# Patient Record
Sex: MALE | Race: WHITE | NOT HISPANIC OR LATINO | Employment: FULL TIME | URBAN - METROPOLITAN AREA
[De-identification: names, ages, dates, MRNs, and addresses within clinical notes are randomized per-mention and may not be internally consistent; named-entity substitution may affect disease eponyms.]

---

## 2017-05-15 ENCOUNTER — NON-PROVIDER VISIT (OUTPATIENT)
Dept: OCCUPATIONAL MEDICINE | Facility: CLINIC | Age: 22
End: 2017-05-15

## 2017-05-15 DIAGNOSIS — Z02.1 PRE-EMPLOYMENT DRUG SCREENING: ICD-10-CM

## 2017-05-15 LAB
AMP AMPHETAMINE: NORMAL
COC COCAINE: NORMAL
INT CON NEG: NORMAL
INT CON POS: NORMAL
MET METHAMPHETAMINES: NORMAL
OPI OPIATES: NORMAL
PCP PHENCYCLIDINE: NORMAL
POC DRUG COMMENT 753798-OCCUPATIONAL HEALTH: NEGATIVE
THC: NORMAL

## 2017-05-15 PROCEDURE — 80305 DRUG TEST PRSMV DIR OPT OBS: CPT | Performed by: PREVENTIVE MEDICINE

## 2017-05-15 NOTE — MR AVS SNAPSHOT
Wilner Cortez   5/15/2017 2:50 PM   Non-Provider Visit   MRN: [id number]    Department:  [facility]   Dept Phone:  [phone]    Description:  Male : 1995   Provider:  CHRISTINE SHAY MA           Reason for Visit     Drug / Alcohol Assessment cero's      Allergies as of 5/15/2017     Not on File      You were diagnosed with     Pre-employment drug screening   [029815]         Basic Information     Date Of Birth Sex Race Ethnicity Preferred Language    1995 Male White Non- English      Health Maintenance     Patient has no pending health maintenance at this time      Results     POCT 6 Panel Urine Drug Screen      Component    AMPHETAMINE    POC THC    COCAINE    OPIATES    PHENCYCLIDINE    METHAMPHETAMINES    POC Urine Drug Screen Comment    Negative    Internal Control Positive    Valid    Internal Control Negative    Valid                        Current Immunizations     No immunizations on file.      Below and/or attached are the medications your provider expects you to take. Review all of your home medications and newly ordered medications with your provider and/or pharmacist. Follow medication instructions as directed by your provider and/or pharmacist. Please keep your medication list with you and share with your provider. Update the information when medications are discontinued, doses are changed, or new medications (including over-the-counter products) are added; and carry medication information at all times in the event of emergency situations     Allergies:  (Not on file)          Medications  Valid as of: May 15, 2017 -  2:57 PM    Generic Name Brand Name Tablet Size Instructions for use    .                 Medicines prescribed today were sent to:     None      Medication refill instructions:       If your prescription bottle indicates you have medication refills left, it is not necessary to call your provider’s office. Please contact your pharmacy and they will refill  your medication.    If your prescription bottle indicates you do not have any refills left, you may request refills at any time through one of the following ways: The online StatSheet system (except Urgent Care), by calling your provider’s office, or by asking your pharmacy to contact your provider’s office with a refill request. Medication refills are processed only during regular business hours and may not be available until the next business day. Your provider may request additional information or to have a follow-up visit with you prior to refilling your medication.   *Please Note: Medication refills are assigned a new Rx number when refilled electronically. Your pharmacy may indicate that no refills were authorized even though a new prescription for the same medication is available at the pharmacy. Please request the medicine by name with the pharmacy before contacting your provider for a refill.           StatSheet Access Code: [email or web address]  Expires: 6/14/2017  2:57 PM    Your email address is not on file at [facility].  Email Addresses are required for you to sign up for StatSheet, please contact [phone] to verify your personal information and to provide your email address prior to attempting to register for StatSheet.    Wilner Cortez  [address] [place] [address]    StatSheet  A secure, online tool to manage your health information     [facility]’s StatSheet® is a secure, online tool that connects you to your personalized health information from the privacy of your home -- day or night - making it very easy for you to manage your healthcare. Once the activation process is completed, you can even access your medical information using the StatSheet jones, which is available for free in the Apple Jones store or Google Play store.     To learn more about StatSheet, visit [email or web address]    There are two levels of access available (as shown below):   My Chart Features  [facility] Primary Care Doctor  [facility]  Specialists [facility]  Urgent  Care [facility] Primary Care Doctor   Email your healthcare team securely and privately 24/7 X X X    Manage appointments: schedule your next appointment; view details of past/upcoming appointments X      Request prescription refills. X      View recent personal medical records, including lab and immunizations X X X X   View health record, including health history, allergies, medications X X X X   Read reports about your outpatient visits, procedures, consult and ER notes X X X X   See your discharge summary, which is a recap of your hospital and/or ER visit that includes your diagnosis, lab results, and care plan X X  X     How to register for EndoShape:  Once your e-mail address has been verified, follow the following steps to sign up for EndoShape.     1. Go to  [email or web address]  2. Click on the Sign Up Now box, which takes you to the New Member Sign Up page. You will need to provide the following information:  a. Enter your EndoShape Access Code exactly as it appears at the top of this page. (You will not need to use this code after you’ve completed the sign-up process. If you do not sign up before the expiration date, you must request a new code.)   b. Enter your date of birth.   c. Enter your home email address.   d. Click Submit, and follow the next screen’s instructions.  3. Create a EndoShape ID. This will be your EndoShape login ID and cannot be changed, so think of one that is secure and easy to remember.  4. Create a EndoShape password. You can change your password at any time.  5. Enter your Password Reset Question and Answer. This can be used at a later time if you forget your password.   6. Enter your e-mail address. This allows you to receive e-mail notifications when new information is available in EndoShape.  7. Click Sign Up. You can now view your health information.    For assistance activating your EndoShape account, call [phone]

## 2023-12-05 ENCOUNTER — HOSPITAL ENCOUNTER (EMERGENCY)
Facility: MEDICAL CENTER | Age: 28
End: 2023-12-05
Attending: EMERGENCY MEDICINE
Payer: MEDICAID

## 2023-12-05 VITALS
SYSTOLIC BLOOD PRESSURE: 116 MMHG | OXYGEN SATURATION: 94 % | DIASTOLIC BLOOD PRESSURE: 78 MMHG | HEART RATE: 76 BPM | BODY MASS INDEX: 28.2 KG/M2 | RESPIRATION RATE: 16 BRPM | HEIGHT: 66 IN | TEMPERATURE: 97.2 F | WEIGHT: 175.49 LBS

## 2023-12-05 DIAGNOSIS — R42 VERTIGO: ICD-10-CM

## 2023-12-05 DIAGNOSIS — R42 DIZZINESS: Primary | ICD-10-CM

## 2023-12-05 LAB
ALBUMIN SERPL BCP-MCNC: 4.8 G/DL (ref 3.2–4.9)
ALBUMIN/GLOB SERPL: 1.3 G/DL
ALP SERPL-CCNC: 117 U/L (ref 30–99)
ALT SERPL-CCNC: 19 U/L (ref 2–50)
ANION GAP SERPL CALC-SCNC: 16 MMOL/L (ref 7–16)
AST SERPL-CCNC: 14 U/L (ref 12–45)
BASOPHILS # BLD AUTO: 0.7 % (ref 0–1.8)
BASOPHILS # BLD: 0.06 K/UL (ref 0–0.12)
BILIRUB SERPL-MCNC: 0.6 MG/DL (ref 0.1–1.5)
BUN SERPL-MCNC: 14 MG/DL (ref 8–22)
CALCIUM ALBUM COR SERPL-MCNC: 9.4 MG/DL (ref 8.5–10.5)
CALCIUM SERPL-MCNC: 10 MG/DL (ref 8.5–10.5)
CHLORIDE SERPL-SCNC: 100 MMOL/L (ref 96–112)
CO2 SERPL-SCNC: 23 MMOL/L (ref 20–33)
CREAT SERPL-MCNC: 0.9 MG/DL (ref 0.5–1.4)
EKG IMPRESSION: NORMAL
EOSINOPHIL # BLD AUTO: 0.01 K/UL (ref 0–0.51)
EOSINOPHIL NFR BLD: 0.1 % (ref 0–6.9)
ERYTHROCYTE [DISTWIDTH] IN BLOOD BY AUTOMATED COUNT: 39.5 FL (ref 35.9–50)
GFR SERPLBLD CREATININE-BSD FMLA CKD-EPI: 120 ML/MIN/1.73 M 2
GLOBULIN SER CALC-MCNC: 3.6 G/DL (ref 1.9–3.5)
GLUCOSE BLD STRIP.AUTO-MCNC: 91 MG/DL (ref 65–99)
GLUCOSE SERPL-MCNC: 96 MG/DL (ref 65–99)
HCT VFR BLD AUTO: 47.8 % (ref 42–52)
HGB BLD-MCNC: 16.6 G/DL (ref 14–18)
IMM GRANULOCYTES # BLD AUTO: 0.02 K/UL (ref 0–0.11)
IMM GRANULOCYTES NFR BLD AUTO: 0.2 % (ref 0–0.9)
LYMPHOCYTES # BLD AUTO: 1.92 K/UL (ref 1–4.8)
LYMPHOCYTES NFR BLD: 21.8 % (ref 22–41)
MCH RBC QN AUTO: 28.7 PG (ref 27–33)
MCHC RBC AUTO-ENTMCNC: 34.7 G/DL (ref 32.3–36.5)
MCV RBC AUTO: 82.7 FL (ref 81.4–97.8)
MONOCYTES # BLD AUTO: 0.46 K/UL (ref 0–0.85)
MONOCYTES NFR BLD AUTO: 5.2 % (ref 0–13.4)
NEUTROPHILS # BLD AUTO: 6.33 K/UL (ref 1.82–7.42)
NEUTROPHILS NFR BLD: 72 % (ref 44–72)
NRBC # BLD AUTO: 0 K/UL
NRBC BLD-RTO: 0 /100 WBC (ref 0–0.2)
PLATELET # BLD AUTO: 325 K/UL (ref 164–446)
PMV BLD AUTO: 9.4 FL (ref 9–12.9)
POTASSIUM SERPL-SCNC: 3.5 MMOL/L (ref 3.6–5.5)
PROT SERPL-MCNC: 8.4 G/DL (ref 6–8.2)
RBC # BLD AUTO: 5.78 M/UL (ref 4.7–6.1)
SODIUM SERPL-SCNC: 139 MMOL/L (ref 135–145)
WBC # BLD AUTO: 8.8 K/UL (ref 4.8–10.8)

## 2023-12-05 PROCEDURE — 85025 COMPLETE CBC W/AUTO DIFF WBC: CPT

## 2023-12-05 PROCEDURE — 80053 COMPREHEN METABOLIC PANEL: CPT

## 2023-12-05 PROCEDURE — 99284 EMERGENCY DEPT VISIT MOD MDM: CPT

## 2023-12-05 PROCEDURE — 700102 HCHG RX REV CODE 250 W/ 637 OVERRIDE(OP): Performed by: EMERGENCY MEDICINE

## 2023-12-05 PROCEDURE — 82962 GLUCOSE BLOOD TEST: CPT

## 2023-12-05 PROCEDURE — A9270 NON-COVERED ITEM OR SERVICE: HCPCS | Performed by: EMERGENCY MEDICINE

## 2023-12-05 PROCEDURE — 36415 COLL VENOUS BLD VENIPUNCTURE: CPT

## 2023-12-05 PROCEDURE — 93005 ELECTROCARDIOGRAM TRACING: CPT

## 2023-12-05 PROCEDURE — 93005 ELECTROCARDIOGRAM TRACING: CPT | Performed by: EMERGENCY MEDICINE

## 2023-12-05 RX ORDER — PROMETHAZINE HYDROCHLORIDE 25 MG/1
25 TABLET ORAL ONCE
Status: COMPLETED | OUTPATIENT
Start: 2023-12-05 | End: 2023-12-05

## 2023-12-05 RX ADMIN — PROMETHAZINE HYDROCHLORIDE 25 MG: 25 TABLET ORAL at 13:29

## 2023-12-05 ASSESSMENT — LIFESTYLE VARIABLES
DO YOU DRINK ALCOHOL: NO
DOES PATIENT WANT TO STOP DRINKING: NO

## 2023-12-05 ASSESSMENT — PAIN DESCRIPTION - PAIN TYPE: TYPE: ACUTE PAIN

## 2023-12-05 NOTE — ED TRIAGE NOTES
"Chief Complaint   Patient presents with    Dizziness     Episodes of dizziness that last for 2-3 days. Intermittent episodes since September. +N/V. Denies CP/SOB.     Pt was diagnosed with vertigo at [facility] and prescribed medication with no relief. Pt goes days without eating due to nausea.   FSBS 91. Pt amb to triage with steady gait. Pt educated to inform staff of any changes.     BP (!) 146/99   Pulse (!) 106   Temp 36.2 °C (97.1 °F) (Temporal)   Resp 16   Ht 1.676 m (5' 6\")   Wt 79.6 kg (175 lb 7.8 oz)   SpO2 98%   BMI 28.32 kg/m²     "

## 2023-12-05 NOTE — ED NOTES
Pt ambulated to the room with steady gait and without assistance. Changed into a gown and placed on SpO2 and BP monitors. Call light within reach. No further complaints at this time. Chart up for ERP.

## 2023-12-05 NOTE — ED PROVIDER NOTES
"  ER Provider Note    Scribed for Gunner Patino Ii, M.d. by Corey Echeverria. 12/5/2023  11:43 AM    Primary Care Provider: Pcp Pt States None    CHIEF COMPLAINT  Chief Complaint   Patient presents with    Dizziness     Episodes of dizziness that last for 2-3 days. Intermittent episodes since September. +N/V. Denies CP/SOB.     EXTERNAL RECORDS REVIEWED  None available    HPI/ROS    LIMITATION TO HISTORY   Select: : None  OUTSIDE HISTORIAN(S):  Significant other at bedside    Wilner Cortez is a 28 y.o. male who presents to the ED complaining of intermittent dizziness onset 3 months ago. The first episode was in September, episodes happen every other week. However 3 days ago he began to have constant dizziness. He is unsure as to if there are any triggers, but when he moves his head fast he gets dizzy. He also feels his symptoms when he stands up. He describes his dizziness as the \"room spinning\". He endorses associated ringing in his ears and vomiting, he states that he has been vomiting fairly regularly. He has not been able to eat anything since Sunday due to his vomiting. He has not been sick recently. [facility] diagnosed with BPPV and started on meclizine. He has been taking this medication and similar medications without alleviation. He does occasionally use marijuana and alcohol. He does not have a history of cannabis hyperemesis.     PAST MEDICAL HISTORY  No past medical history on file.    SURGICAL HISTORY  No past surgical history on file.    FAMILY HISTORY  No family history on file.    SOCIAL HISTORY   reports that he has never smoked. He has never used smokeless tobacco. He reports current alcohol use. He reports current drug use. Drug: Inhaled.    CURRENT MEDICATIONS  There are no discharge medications for this patient.      ALLERGIES  Patient has no known allergies.    PHYSICAL EXAM  BP (!) 146/99   Pulse (!) 106   Temp 36.2 °C (97.1 " "°F) (Temporal)   Resp 16   Ht 1.676 m (5' 6\")   Wt 79.6 kg (175 lb 7.8 oz)   SpO2 98%   BMI 28.32 kg/m²   Physical Exam  Vitals and nursing note reviewed.   Constitutional:       Appearance: Normal appearance.   HENT:      Head: Normocephalic.      Right Ear: Tympanic membrane normal.      Left Ear: Tympanic membrane normal.      Nose: Nose normal.   Eyes:      Extraocular Movements: Extraocular movements intact.      Conjunctiva/sclera: Conjunctivae normal.      Pupils: Pupils are equal, round, and reactive to light.      Comments: No nystagmus   Cardiovascular:      Rate and Rhythm: Normal rate and regular rhythm.   Pulmonary:      Effort: Pulmonary effort is normal.      Breath sounds: Normal breath sounds.   Abdominal:      General: There is no distension.      Tenderness: There is no abdominal tenderness.   Musculoskeletal:         General: No swelling or tenderness. Normal range of motion.      Cervical back: Normal range of motion. No rigidity.   Skin:     General: Skin is warm.   Neurological:      General: No focal deficit present.      Mental Status: He is alert and oriented to person, place, and time.      Comments: Alert and oriented to person place time situation.  No signs of cranial nerve deficit.  Clear speech.  No aphasia.  Normal strength in all extremities.  Walks with a steady gait.  No peripheral or truncal ataxia.  No sensory loss.   Psychiatric:         Mood and Affect: Mood normal.          DIAGNOSTIC STUDIES    Labs:   Results for orders placed or performed during the hospital encounter of 12/05/23   CBC WITH DIFFERENTIAL   Result Value Ref Range    WBC 8.8 4.8 - 10.8 K/uL    RBC 5.78 4.70 - 6.10 M/uL    Hemoglobin 16.6 14.0 - 18.0 g/dL    Hematocrit 47.8 42.0 - 52.0 %    MCV 82.7 81.4 - 97.8 fL    MCH 28.7 27.0 - 33.0 pg    MCHC 34.7 32.3 - 36.5 g/dL    RDW 39.5 35.9 - 50.0 fL    Platelet Count 325 164 - 446 K/uL    MPV 9.4 9.0 - 12.9 fL    Neutrophils-Polys 72.00 44.00 - 72.00 %    " Lymphocytes 21.80 (L) 22.00 - 41.00 %    Monocytes 5.20 0.00 - 13.40 %    Eosinophils 0.10 0.00 - 6.90 %    Basophils 0.70 0.00 - 1.80 %    Immature Granulocytes 0.20 0.00 - 0.90 %    Nucleated RBC 0.00 0.00 - 0.20 /100 WBC    Neutrophils (Absolute) 6.33 1.82 - 7.42 K/uL    Lymphs (Absolute) 1.92 1.00 - 4.80 K/uL    Monos (Absolute) 0.46 0.00 - 0.85 K/uL    Eos (Absolute) 0.01 0.00 - 0.51 K/uL    Baso (Absolute) 0.06 0.00 - 0.12 K/uL    Immature Granulocytes (abs) 0.02 0.00 - 0.11 K/uL    NRBC (Absolute) 0.00 K/uL   Comp Metabolic Panel   Result Value Ref Range    Sodium 139 135 - 145 mmol/L    Potassium 3.5 (L) 3.6 - 5.5 mmol/L    Chloride 100 96 - 112 mmol/L    Co2 23 20 - 33 mmol/L    Anion Gap 16.0 7.0 - 16.0    Glucose 96 65 - 99 mg/dL    Bun 14 8 - 22 mg/dL    Creatinine 0.90 0.50 - 1.40 mg/dL    Calcium 10.0 8.5 - 10.5 mg/dL    Correct Calcium 9.4 8.5 - 10.5 mg/dL    AST(SGOT) 14 12 - 45 U/L    ALT(SGPT) 19 2 - 50 U/L    Alkaline Phosphatase 117 (H) 30 - 99 U/L    Total Bilirubin 0.6 0.1 - 1.5 mg/dL    Albumin 4.8 3.2 - 4.9 g/dL    Total Protein 8.4 (H) 6.0 - 8.2 g/dL    Globulin 3.6 (H) 1.9 - 3.5 g/dL    A-G Ratio 1.3 g/dL   ESTIMATED GFR   Result Value Ref Range    GFR (CKD-EPI) 120 >60 mL/min/1.73 m 2   EKG   Result Value Ref Range    Report       [facility] Emergency Dept.    Test Date:  2023  Pt Name:    IAM ARBOLEDA                 Department: ER  MRN:        [id number]                      Room:  Gender:     Male                         Technician: [id number]  :        1995                   Requested By:ER TRIAGE PROTOCOL  Order #:    [id number]                    Reading MD: Gunner Patino II, MD    Measurements  Intervals                                Axis  Rate:       72                           P:          52  GA:         124                          QRS:        66  QRSD:       97                           T:          43  QT:         386  QTc:         423    Interpretive Statements  Sinus rhythm  Rate 72  Normal intervals  No ST elevation or depression.  Impression: Normal sinus rhythm EKG  No previous ECG available for comparison  Electronically Signed On 12- 12:30:27 PST by Gunner Patino II, MD     POCT glucose device results   Result Value Ref Range    POC Glucose, Blood 91 65 - 99 mg/dL      All labs reviewed by me.     EKG:   I have independently interpreted this EKG. see results above.    COURSE & MEDICAL DECISION MAKING     ED Observation Status? No; Patient does not meet criteria for ED Observation.     INITIAL ASSESSMENT, COURSE AND PLAN  Care Narrative:     12:33 PM - Patient seen and examined at bedside. He presents for dizziness and vomiting which began 3 months ago but has been constant since Sunday. Symptoms are very consistent with BPPV.  No signs of CVA.  He has no neurologic deficits.  We discussed at length his symptoms, reasons for treatment at prior ER visits at outside facilities.  He has no positive physical findings at this time.  I understand that he is experiencing some symptoms of dizziness which if vertigo is likely peripheral and benign.  Will medicate with Phenergan 25 mg as he has been unable to keep anything down.  Denies excessive alcohol or cannabis use.  I did consider cannabis hyperemesis possibly causing the vomiting.  He is not been vomiting here in the ER.  Plan to give Phenergan and p.o. challenge.  Labs were done at triage and do not suggest dehydration or organ failure.    1:34 PM - Patient was reevaluated at bedside. He is feeling improved following medications. Tolerating oral intake, vitals remain stable, normal neurologic exam.  I checked his blood pressure and heart rate after he stood up and BP was normal.  No increase in heart rate.  He is not orthostatic.  Patient will now be discharged at this time. Discussed return precautions and plan for at home care. Patient verbalizes understanding and agreement to  this plan of care.       PROBLEM LIST  #Dizziness   -History sounds consistent with peripheral vertigo.  He is not having any neurologic deficits here in the ER.  It difficult to reproduce nystagmus with changes in position.  I recommend that he continue outpatient follow-up.  I placed referral for neurology.  EKG, labs were within acceptable limits.    DISPOSITION AND DISCUSSIONS    Discussion of management with other [facility] or appropriate source(s): None       Barriers to care at this time, including but not limited to: Patient does not have established PCP.     DISPOSITION:  Patient will be discharged home in stable condition.    FOLLOW UP:  [facility]  [address]  [facility] [address]  [phone]  Schedule an appointment as soon as possible for a visit       [facility]  [address]  [place] [facility] [facility] [place] [address]  [phone]  Schedule an appointment as soon as possible for a visit       FINAL DIANGOSIS  1. Dizziness    2. Vertigo      Corey MILLIGAN (Scribe), am scribing for, and in the presence of, LEIGHTON Zavala II.    Electronically signed by: Corey Echeverria (Scribe), 12/5/2023    IGunner II, M* personally performed the services described in this documentation, as scribed by Corey Echeverria in my presence, and it is both accurate and complete.      The note accurately reflects work and decisions made by me.  Gunner Patino II, M.D.  12/5/2023  4:44 PM

## 2023-12-05 NOTE — ED NOTES
Received orders for DC. Educated regarding f/u with occupational health and neurology. VSS. Able to dress self and is ambulatory with a steady gait. States he feels slightly better after antemetic/fluid challenge. All questions address.

## 2023-12-12 ENCOUNTER — OFFICE VISIT (OUTPATIENT)
Dept: MEDICAL GROUP | Facility: MEDICAL CENTER | Age: 28
End: 2023-12-12
Attending: FAMILY MEDICINE
Payer: MEDICAID

## 2023-12-12 VITALS
DIASTOLIC BLOOD PRESSURE: 78 MMHG | RESPIRATION RATE: 16 BRPM | HEART RATE: 86 BPM | SYSTOLIC BLOOD PRESSURE: 112 MMHG | BODY MASS INDEX: 28.93 KG/M2 | WEIGHT: 180 LBS | TEMPERATURE: 98.4 F | OXYGEN SATURATION: 97 % | HEIGHT: 66 IN

## 2023-12-12 DIAGNOSIS — Z11.59 NEED FOR HEPATITIS C SCREENING TEST: ICD-10-CM

## 2023-12-12 DIAGNOSIS — Z76.89 ENCOUNTER TO ESTABLISH CARE: ICD-10-CM

## 2023-12-12 DIAGNOSIS — H81.10 BENIGN PAROXYSMAL POSITIONAL VERTIGO, UNSPECIFIED LATERALITY: ICD-10-CM

## 2023-12-12 DIAGNOSIS — Z11.3 ROUTINE SCREENING FOR STI (SEXUALLY TRANSMITTED INFECTION): ICD-10-CM

## 2023-12-12 DIAGNOSIS — Z13.6 SCREENING FOR CARDIOVASCULAR CONDITION: ICD-10-CM

## 2023-12-12 PROCEDURE — 99213 OFFICE O/P EST LOW 20 MIN: CPT | Performed by: NURSE PRACTITIONER

## 2023-12-12 PROCEDURE — 3078F DIAST BP <80 MM HG: CPT | Performed by: NURSE PRACTITIONER

## 2023-12-12 PROCEDURE — 99204 OFFICE O/P NEW MOD 45 MIN: CPT | Performed by: NURSE PRACTITIONER

## 2023-12-12 PROCEDURE — 3074F SYST BP LT 130 MM HG: CPT | Performed by: NURSE PRACTITIONER

## 2023-12-12 RX ORDER — ONDANSETRON 4 MG/1
4 TABLET, ORALLY DISINTEGRATING ORAL EVERY 6 HOURS PRN
Qty: 10 TABLET | Refills: 0 | Status: SHIPPED | OUTPATIENT
Start: 2023-12-12

## 2023-12-12 ASSESSMENT — PATIENT HEALTH QUESTIONNAIRE - PHQ9: CLINICAL INTERPRETATION OF PHQ2 SCORE: 0

## 2023-12-12 ASSESSMENT — FIBROSIS 4 INDEX: FIB4 SCORE: 0.27

## 2023-12-26 PROBLEM — Z76.89 ENCOUNTER TO ESTABLISH CARE: Status: ACTIVE | Noted: 2023-12-26

## 2023-12-26 PROBLEM — H81.10 BENIGN PAROXYSMAL POSITIONAL VERTIGO: Status: ACTIVE | Noted: 2023-12-26

## 2023-12-26 NOTE — PROGRESS NOTES
Chief Complaint   Patient presents with    Establish Care     Vertigo. Paperwork for return to work.        Subjective:     HPI:   Wilner Cortez is a 28 y.o. male here to discuss the evaluation and management of:        Problem   Benign Paroxysmal Positional Vertigo    Patient states that he has noticed that he becomes dizzy at times when he gets up too fast or turns his head left to right very fast.  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  Patient was seen at 2 different ER visits and diagnosed with BP PV, and provided medications at each time.  Patient is concerned about going back to work with the BPPV reoccurring     Encounter to Establish Care    Patient here to establish care.  No previous PCP.  Only concern at this time is dizziness that is affecting his job.         ROS  See HPI       No Known Allergies    Current medicines (including changes today)  Current Outpatient Medications   Medication Sig Dispense Refill    ondansetron (ZOFRAN ODT) 4 MG TABLET DISPERSIBLE Take 1 Tablet by mouth every 6 hours as needed for Nausea/Vomiting. 10 Tablet 0     No current facility-administered medications for this visit.       Social History     Tobacco Use    Smoking status: Never    Smokeless tobacco: Never   Vaping Use    Vaping Use: Every day    Substances: Nicotine, THC, Flavoring    Devices: [employer]. Geothermal tank   Substance Use Topics    Alcohol use: Yes     Comment: occ    Drug use: Yes     Types: Inhaled, Marijuana     Comment: marijuana       Patient Active Problem List    Diagnosis Date Noted    Benign paroxysmal positional vertigo 12/26/2023    Encounter to establish care 12/26/2023       Family History   Problem Relation Age of Onset    No Known Problems Mother     No Known Problems Father           Objective:     /78 (BP Location: Left arm, Patient Position: Sitting)   Pulse 86   Temp  "36.9 °C (98.4 °F) (Temporal)   Resp 16   Ht 1.676 m (5' 6\")   Wt 81.6 kg (180 lb)   SpO2 97%  Body mass index is 29.05 kg/m².    Physical Exam:  Physical Exam  Vitals reviewed.   Constitutional:       General: He is awake.      Appearance: Normal appearance. He is well-developed.   HENT:      Head: Normocephalic.   Eyes:      Conjunctiva/sclera: Conjunctivae normal.   Cardiovascular:      Rate and Rhythm: Normal rate and regular rhythm.      Heart sounds: Normal heart sounds.   Pulmonary:      Effort: Pulmonary effort is normal. No respiratory distress.      Breath sounds: Normal breath sounds. No wheezing.   Musculoskeletal:      Cervical back: Neck supple. No tenderness.   Lymphadenopathy:      Cervical: No cervical adenopathy.   Skin:     General: Skin is warm and dry.   Neurological:      Mental Status: He is alert and oriented to person, place, and time.   Psychiatric:         Mood and Affect: Mood normal.         Behavior: Behavior normal. Behavior is cooperative.         Assessment and Plan:     The following treatment plan was discussed:    Problem List Items Addressed This Visit       Benign paroxysmal positional vertigo     Ongoing, uncontrolled-  Discussed triggers for BPPV including dehydration, frequent URIs, and ear issues.  Advised patient to stay extremely hydrated and avoid caffeine  Patient has meclizine at home but it was not very helpful, recommended he could try scopolamine patches and see if these were beneficial  Provided Zofran to help with nausea during these episodes  Will have patient obtain lab work however his blood work overall was not abnormal at his ER visit  Referral to physical therapy for vestibular therapy  Discussed Epley maneuvers at home that he can try doing every day and see if this helps  Filled out paperwork for his work stating he may need accommodations secondary to his BPPV         Relevant Medications    ondansetron (ZOFRAN ODT) 4 MG TABLET DISPERSIBLE    Other " Relevant Orders    Referral to Physical Therapy    Encounter to establish care     EDDiscussed health history and maintenance   Flu vaccine - Declined   Colon Ca screening - Not applicable   Preventative screening labs ordered -patient will obtain these and follow-up with me afterwards for any abnormalities.            Other Visit Diagnoses       Screening for cardiovascular condition        Relevant Orders    Lipid Profile    HEMOGLOBIN A1C    VITAMIN D,25 HYDROXY (DEFICIENCY)    Comp Metabolic Panel    Need for hepatitis C screening test        Relevant Orders    HEP C VIRUS ANTIBODY    Routine screening for STI (sexually transmitted infection)        Relevant Orders    HIV AG/AB COMBO ASSAY SCREENING    T.PALLIDUM AB KENNETH (SCREENING)    Chlamydia/GC, PCR (Urine)            Any change or worsening of signs or symptoms, patient encouraged to follow-up or report to emergency room for further evaluation. Patient verbalizes understanding and agrees.    Follow-Up: Follow-up as needed      PLEASE NOTE: This dictation was created using voice recognition software. I have made every reasonable attempt to correct obvious errors, but I expect that there are errors of grammar and possibly content that I did not discover before finalizing the note.

## 2023-12-26 NOTE — ASSESSMENT & PLAN NOTE
EDDiscussed health history and maintenance   Flu vaccine - Declined   Colon Ca screening - Not applicable   Preventative screening labs ordered -patient will obtain these and follow-up with me afterwards for any abnormalities.

## 2023-12-26 NOTE — ASSESSMENT & PLAN NOTE
Ongoing, uncontrolled-  Discussed triggers for BPPV including dehydration, frequent URIs, and ear issues.  Advised patient to stay extremely hydrated and avoid caffeine  Patient has meclizine at home but it was not very helpful, recommended he could try scopolamine patches and see if these were beneficial  Provided Zofran to help with nausea during these episodes  Will have patient obtain lab work however his blood work overall was not abnormal at his ER visit  Referral to physical therapy for vestibular therapy  Discussed Epley maneuvers at home that he can try doing every day and see if this helps  Filled out paperwork for his work stating he may need accommodations secondary to his BPPV

## 2024-01-12 ENCOUNTER — APPOINTMENT (OUTPATIENT)
Dept: PHYSICAL THERAPY | Facility: REHABILITATION | Age: 29
End: 2024-01-12
Attending: NURSE PRACTITIONER
Payer: MEDICAID

## 2024-01-18 ENCOUNTER — PHYSICAL THERAPY (OUTPATIENT)
Dept: PHYSICAL THERAPY | Facility: REHABILITATION | Age: 29
End: 2024-01-18
Attending: NURSE PRACTITIONER
Payer: MEDICAID

## 2024-01-18 DIAGNOSIS — H83.2X2 VESTIBULAR HYPOFUNCTION OF LEFT EAR: ICD-10-CM

## 2024-01-18 DIAGNOSIS — H81.10 BPPV (BENIGN PAROXYSMAL POSITIONAL VERTIGO), UNSPECIFIED LATERALITY: ICD-10-CM

## 2024-01-18 PROCEDURE — 97162 PT EVAL MOD COMPLEX 30 MIN: CPT

## 2024-01-18 PROCEDURE — 999999 HB NO CHARGE

## 2024-01-18 ASSESSMENT — ENCOUNTER SYMPTOMS
PAIN SCALE AT LOWEST: 0
PAIN SCALE: 0

## 2024-01-18 NOTE — OP THERAPY EVALUATION
Outpatient Physical Therapy  VESTIBULAR EVALUATION    [facility] Physical Therapy [facility]  [address]  [address]  [facility] [address]  Phone:  [phone]  Fax:  [phone]    Date of Evaluation: 2024    Patient: Wilner Cortez  YOB: 1995  MRN: [id number]     Referring Provider: TOREY Doll  [address]  [address]  [place] [address]   Referring Diagnosis: Benign paroxysmal vertigo, unspecified ear [H81.10]     Time Calculation    Start time: 1330  Stop time: 1408 Time Calculation (min): 38 minutes           Chief Complaint: Vertigo    Visit Diagnoses     ICD-10-CM   1. BPPV (benign paroxysmal positional vertigo), unspecified laterality  H81.10   2. Vestibular hypofunction of left ear  H83.2X2         History of Present Illness:     Mechanism of injury:  Onset symptoms November with 2 x ED visits 1 Nov, 1 Dec about 2 weeks apart    Pt reports onset room spinning/dizziness since September  Pt reports the spinning can last for hours. Can dec the intensity with laying down but symptoms will continue. He then feels fine after, same day. Symptoms happen every two weeks but once went for three weeks. Symptoms always happen after being at work a few hours but then states did happen 1x a home and was not as severe.  Feels off balance when this occurs but able to walk, drive himself home etc.    Last episode occurred Tuesday he was sitting on a chair getting up/down a lot and about 3 hours in experience vomiting, but stomach not upset and describes his head feeling fuzzy. 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  Social History     Tobacco Use    Smoking status: Never    Smokeless tobacco: Never   Substance Use Topics    Alcohol use: Yes     Comment: occ     Family and Occupational History     Socioeconomic History    Marital status:      Spouse name: Not on file    Number of children: Not on file    Years of education: Not on file    Highest education level: Not on file   Occupational History    Not on file       Objective:    Oculomotor Exam:         Details: No spontaneous nystagmus central gaze      Spontaneous nystagmus eccentric gaze        Details: small, unsustained eye movements at end range    No saccadic eye movements    Smooth pursuit present    Convergence:        Normal convergence  Limb Ataxia Exam:     Finger-to-Nose:         Intention tremor: none        Overshooting: none    Dysdiadochokinesia: pronation-supination  Vestibulo-Ocular Exam:     Abnormal head thrust test        Details: corrective saccade on head moving left    Comments: Very min symptoms described as \"off\" but not similar to his symptoms  BPPV Exam:     Abnormal Sterling-Hallpike        Findings: positive right and fatigable    Comments: Positive for subjective complaints only described as \"fuzzy\" and \"off\" no room spinning or sensations similar to his complaints        Therapeutic Treatments and Modalities:     1. Canalith Repositioning (CPT 44593)    Therapeutic Treatment and Modalities Summary: Right epley performed x 2. No nystagmus only complaints of off/fuzzy that fatigued.    Educated on anatomy and physiology of vestibular system and BPPV using model.  Educated on post maneuver precautions including avoiding laying on side, avoiding large head movements for today and avoiding laying down or reclining when getting home. Education provided on VOR/hypofunction. Encouraged seeking possible ENT referral    Time-based treatments/modalities:             Assessment:     Functional impairments:  " Decreased utilization of VIS/vest/somato, positive BPPV (right) and decreased gaze stabilization    Assessment details:  Wilner presents with chief complaint of intermittent vertigo described as room spinning accompanied by vomiting that last for hours then spontaneous improvement. Of these events all but one has occurred while working and has 2 weeks on average between events. Pt complaints inconsistent with BPPV due to duration of symptoms however did assess with pt complaints of feeling off/fuzzy therefore completed Epley maneuver.He also has left sided hypofunction via head impulse test but did not produce his symptoms. There is possibility of psychosocial aspects impacting his symptoms. He will benefit from skilled PT 1 x 6 for trial of VRT and canalith repositioning maneuvers. Did encouraged pt to possibly seek ENT referral due to extended wait to see one if symptoms do not improve here.     Barriers to therapy:  Comorbidities and psychosocial    Prognosis: fair    Prognosis details:  Events typically occur at work, duration and frequency of symptoms  Goals:     Short term goals:  1. Pt will resolve symptoms in Rt trudy hallpike.    Short term goal time span:  1-2 weeks    Long term goals:  1. Pt will demonstrate subjective improvement with DHI 15% or better.  2. Pt will be independent with left epley maneuver.   3. Pt will report no symptoms x 1 month.    Long term goal time span:  4-6 weeks  Plan:     Therapy options:  Physical therapy treatment to continue    Planned therapy interventions:  Canalith Repositioning (CPT 25816) and Neuromuscular Re-education (CPT 94497)    Frequency:  1x week    Duration in weeks:  6    Discussed with:  Patient     Plan details:  95992 x 1, 97112 x 3      Functional Assessment Used  Dizziness Handicap Inventory - Physical Items Score: 18  Dizziness Handicap Inventory - Emotional Items Score: 10  Dizziness Handicap Inventory - Functional Items Score: 10  Dizziness Handicap  Inventory - Total Score: 38       Referring provider co-signature:  I have reviewed this plan of care and my co-signature certifies the need for services.    Certification Period: 01/18/2024 to  03/18/24    Physician Signature: ________________________________ Date: ______________

## 2024-02-02 ENCOUNTER — APPOINTMENT (OUTPATIENT)
Dept: PHYSICAL THERAPY | Facility: REHABILITATION | Age: 29
End: 2024-02-02
Attending: NURSE PRACTITIONER
Payer: MEDICAID

## 2024-02-16 ENCOUNTER — TELEPHONE (OUTPATIENT)
Dept: PHYSICAL THERAPY | Facility: REHABILITATION | Age: 29
End: 2024-02-16
Payer: MEDICAID

## 2024-02-16 NOTE — OP THERAPY DISCHARGE SUMMARY
Outpatient Physical Therapy  DISCHARGE SUMMARY NOTE      [facility] Therapy [facility]  [address]  [address]  [facility] [address]  Phone:  [phone]  Fax:  [phone]    Date of Visit: 02/16/2024    Patient: Wilner Cortez  YOB: 1995  MRN: [id number]     Referring Provider: TOREY Doll  [address] [address]   Referring Diagnosis: Benign paroxysmal vertigo, unspecified ear [H81.10]         Functional Assessment Used        Your patient is being discharged from Physical Therapy with the following comments:   Pt no showed two appointments after evaluation on 1/18. Due to no show policy discharged POC.     [place], PT, DPT    Date: 2/16/2024

## 2024-02-23 ENCOUNTER — APPOINTMENT (OUTPATIENT)
Dept: PHYSICAL THERAPY | Facility: REHABILITATION | Age: 29
End: 2024-02-23
Attending: NURSE PRACTITIONER
Payer: MEDICAID

## 2024-03-01 ENCOUNTER — APPOINTMENT (OUTPATIENT)
Dept: PHYSICAL THERAPY | Facility: REHABILITATION | Age: 29
End: 2024-03-01
Attending: NURSE PRACTITIONER
Payer: MEDICAID

## 2024-03-08 ENCOUNTER — APPOINTMENT (OUTPATIENT)
Dept: PHYSICAL THERAPY | Facility: REHABILITATION | Age: 29
End: 2024-03-08
Attending: NURSE PRACTITIONER
Payer: MEDICAID

## 2024-03-15 ENCOUNTER — APPOINTMENT (OUTPATIENT)
Dept: PHYSICAL THERAPY | Facility: REHABILITATION | Age: 29
End: 2024-03-15
Attending: NURSE PRACTITIONER
Payer: MEDICAID

## 2024-03-22 ENCOUNTER — APPOINTMENT (OUTPATIENT)
Dept: PHYSICAL THERAPY | Facility: REHABILITATION | Age: 29
End: 2024-03-22
Attending: NURSE PRACTITIONER
Payer: MEDICAID